# Patient Record
Sex: FEMALE | Race: OTHER
[De-identification: names, ages, dates, MRNs, and addresses within clinical notes are randomized per-mention and may not be internally consistent; named-entity substitution may affect disease eponyms.]

---

## 2019-09-11 PROBLEM — Z00.00 ENCOUNTER FOR PREVENTIVE HEALTH EXAMINATION: Status: ACTIVE | Noted: 2019-09-11

## 2019-09-16 ENCOUNTER — OTHER (OUTPATIENT)
Age: 74
End: 2019-09-16

## 2019-09-17 ENCOUNTER — APPOINTMENT (OUTPATIENT)
Dept: CARDIOLOGY | Facility: CLINIC | Age: 74
End: 2019-09-17
Payer: MEDICARE

## 2019-09-17 VITALS
DIASTOLIC BLOOD PRESSURE: 76 MMHG | WEIGHT: 152 LBS | BODY MASS INDEX: 32.79 KG/M2 | HEART RATE: 71 BPM | HEIGHT: 57 IN | SYSTOLIC BLOOD PRESSURE: 142 MMHG

## 2019-09-17 DIAGNOSIS — Z86.69 PERSONAL HISTORY OF OTHER DISEASES OF THE NERVOUS SYSTEM AND SENSE ORGANS: ICD-10-CM

## 2019-09-17 DIAGNOSIS — Z87.891 PERSONAL HISTORY OF NICOTINE DEPENDENCE: ICD-10-CM

## 2019-09-17 DIAGNOSIS — Z86.79 PERSONAL HISTORY OF OTHER DISEASES OF THE CIRCULATORY SYSTEM: ICD-10-CM

## 2019-09-17 PROCEDURE — 93000 ELECTROCARDIOGRAM COMPLETE: CPT

## 2019-09-17 PROCEDURE — 99204 OFFICE O/P NEW MOD 45 MIN: CPT

## 2019-09-17 NOTE — PHYSICAL EXAM
[General Appearance - Well Developed] : well developed [Normal Appearance] : normal appearance [Well Groomed] : well groomed [General Appearance - Well Nourished] : well nourished [No Deformities] : no deformities [General Appearance - In No Acute Distress] : no acute distress [Normal Oral Mucosa] : normal oral mucosa [No Oral Cyanosis] : no oral cyanosis [No Oral Pallor] : no oral pallor [Normal Jugular Venous A Waves Present] : normal jugular venous A waves present [Normal Jugular Venous V Waves Present] : normal jugular venous V waves present [No Jugular Venous Sebastian A Waves] : no jugular venous sebastian A waves [Respiration, Rhythm And Depth] : normal respiratory rhythm and effort [Exaggerated Use Of Accessory Muscles For Inspiration] : no accessory muscle use [Auscultation Breath Sounds / Voice Sounds] : lungs were clear to auscultation bilaterally [Heart Sounds] : normal S1 and S2 [Heart Rate And Rhythm] : heart rate and rhythm were normal [Murmurs] : no murmurs present [Abdomen Tenderness] : non-tender [Abdomen Soft] : soft [Abdomen Mass (___ Cm)] : no abdominal mass palpated [Cyanosis, Localized] : no localized cyanosis [Petechial Hemorrhages (___cm)] : no petechial hemorrhages [Nail Clubbing] : no clubbing of the fingernails [] : no ischemic changes [Skin Color & Pigmentation] : normal skin color and pigmentation [Oriented To Time, Place, And Person] : oriented to person, place, and time

## 2019-09-17 NOTE — HISTORY OF PRESENT ILLNESS
[FreeTextEntry1] : 73 yo female with pmhx as below was initially seen for htn/graham. 2d echo at the time was ok, pt was advised for stress but was lost to follow up, s/p cta greater vasculature - no sign ds\par here for a f/up visit,  ongoing exertional cp and dyspnea,  mild activities, +associated palpitations, no rest/nocturnal symptoms.\par Otherwise, no c/o sob, pnd, orthopnea, or peripheral edema. No syncope or dizziness. \par Et is stable \par non complaint with meds - stopped all bp meds a while ago

## 2019-09-17 NOTE — ASSESSMENT
[FreeTextEntry1] : # SHORTNESS OF BREATH (R06.02):\par # UNSPECIFIED ESSENTIAL HYPERTENSION (I10):\par # MIXED HYPERLIPIDEMIA (E78.2):\par # NONSPECIFIC ABNORMAL ELECTROCARDIOGRAM (ECG) (EKG) (R94.31):\par \par 73 yo female with pmhx and presentation as above\par Ongoing MOHR\par htn/dyslipidemia\par \par BP measurement at home for 2-3 weeks\par repeat labs\par red yeast rice - hesitant to take statins\par stress test to r/out cad\par Aggressive risk modif\par Heart healthy diet\par RTC 3-4 WEEKS

## 2019-09-17 NOTE — REASON FOR VISIT
[Consultation] : a consultation regarding [Chest Pain] : chest pain [Dyspnea] : dyspnea [Hyperlipidemia] : hyperlipidemia [Hypertension] : hypertension

## 2019-10-08 ENCOUNTER — APPOINTMENT (OUTPATIENT)
Dept: CARDIOLOGY | Facility: CLINIC | Age: 74
End: 2019-10-08
Payer: MEDICARE

## 2019-10-08 VITALS
DIASTOLIC BLOOD PRESSURE: 80 MMHG | BODY MASS INDEX: 32.36 KG/M2 | SYSTOLIC BLOOD PRESSURE: 148 MMHG | HEART RATE: 67 BPM | HEIGHT: 57 IN | WEIGHT: 150 LBS

## 2019-10-08 DIAGNOSIS — R06.02 SHORTNESS OF BREATH: ICD-10-CM

## 2019-10-08 DIAGNOSIS — E78.2 MIXED HYPERLIPIDEMIA: ICD-10-CM

## 2019-10-08 DIAGNOSIS — I10 ESSENTIAL (PRIMARY) HYPERTENSION: ICD-10-CM

## 2019-10-08 PROCEDURE — 93000 ELECTROCARDIOGRAM COMPLETE: CPT

## 2019-10-08 PROCEDURE — 99214 OFFICE O/P EST MOD 30 MIN: CPT

## 2019-10-08 RX ORDER — VALSARTAN 80 MG/1
80 TABLET, COATED ORAL
Qty: 30 | Refills: 2 | Status: ACTIVE | COMMUNITY
Start: 2019-10-08 | End: 1900-01-01

## 2019-10-08 NOTE — HISTORY OF PRESENT ILLNESS
[FreeTextEntry1] : 73 yo female with pmhx as below was initially seen for htn/graham. 2d echo at the time was ok, pt was advised for stress but was lost to follow up, s/p cta greater vasculature - no sign ds\par here for a f/up visit,  ongoing exertional cp and dyspnea, +associated palpitations, no rest/nocturnal symptoms.\par No c/o sob, pnd, orthopnea, or peripheral edema. No syncope or dizziness. \par Et is stable \par bp readings at home - 150-160s range

## 2019-10-08 NOTE — ASSESSMENT
[FreeTextEntry1] : # SHORTNESS OF BREATH (R06.02):\par # UNSPECIFIED ESSENTIAL HYPERTENSION (I10):\par # MIXED HYPERLIPIDEMIA (E78.2):\par # NONSPECIFIC ABNORMAL ELECTROCARDIOGRAM (ECG) (EKG) (R94.31):\par \par 75 yo female with pmhx and presentation as above\par Ongoing MOHR\par htn/dyslipidemia\par \par BP measurement at home for 2-3 weeks reviewed, will add low dose diovan \par repeat labs - lipids much better\par stress test to r/out cad once bp is under better control\par Aggressive risk modif\par Heart healthy diet\par RTC 6-8  WEEKS

## 2019-10-08 NOTE — PHYSICAL EXAM
[General Appearance - Well Developed] : well developed [Normal Appearance] : normal appearance [General Appearance - Well Nourished] : well nourished [Well Groomed] : well groomed [No Deformities] : no deformities [General Appearance - In No Acute Distress] : no acute distress [Normal Oral Mucosa] : normal oral mucosa [No Oral Pallor] : no oral pallor [No Oral Cyanosis] : no oral cyanosis [Normal Jugular Venous A Waves Present] : normal jugular venous A waves present [Normal Jugular Venous V Waves Present] : normal jugular venous V waves present [No Jugular Venous Sebastian A Waves] : no jugular venous sebastian A waves [Respiration, Rhythm And Depth] : normal respiratory rhythm and effort [Exaggerated Use Of Accessory Muscles For Inspiration] : no accessory muscle use [Auscultation Breath Sounds / Voice Sounds] : lungs were clear to auscultation bilaterally [Heart Rate And Rhythm] : heart rate and rhythm were normal [Heart Sounds] : normal S1 and S2 [Murmurs] : no murmurs present [Abdomen Soft] : soft [Abdomen Tenderness] : non-tender [Abdomen Mass (___ Cm)] : no abdominal mass palpated [Nail Clubbing] : no clubbing of the fingernails [Cyanosis, Localized] : no localized cyanosis [Petechial Hemorrhages (___cm)] : no petechial hemorrhages [] : no ischemic changes [Skin Color & Pigmentation] : normal skin color and pigmentation [Oriented To Time, Place, And Person] : oriented to person, place, and time

## 2019-12-10 ENCOUNTER — APPOINTMENT (OUTPATIENT)
Dept: CARDIOLOGY | Facility: CLINIC | Age: 74
End: 2019-12-10